# Patient Record
Sex: MALE | Race: WHITE | Employment: STUDENT | URBAN - NONMETROPOLITAN AREA
[De-identification: names, ages, dates, MRNs, and addresses within clinical notes are randomized per-mention and may not be internally consistent; named-entity substitution may affect disease eponyms.]

---

## 2023-10-05 ENCOUNTER — NURSE ONLY (OUTPATIENT)
Age: 23
End: 2023-10-05

## 2023-10-05 DIAGNOSIS — Z51.6 ALLERGY DESENSITIZATION THERAPY: Primary | ICD-10-CM

## 2023-10-05 PROCEDURE — 99999 PR OFFICE/OUTPT VISIT,PROCEDURE ONLY: CPT | Performed by: NURSE PRACTITIONER

## 2023-10-05 RX ORDER — BECLOMETHASONE DIPROPIONATE 80 UG/1
AEROSOL, METERED NASAL
COMMUNITY
Start: 2023-09-29

## 2023-10-05 RX ORDER — LISDEXAMFETAMINE DIMESYLATE CAPSULES 60 MG/1
CAPSULE ORAL
COMMUNITY
Start: 2023-09-01

## 2023-10-05 RX ORDER — ROSUVASTATIN CALCIUM 20 MG/1
20 TABLET, COATED ORAL DAILY
COMMUNITY
Start: 2023-09-28

## 2023-10-05 RX ORDER — DULOXETIN HYDROCHLORIDE 60 MG/1
CAPSULE, DELAYED RELEASE ORAL DAILY
COMMUNITY
Start: 2023-09-28

## 2023-10-05 SDOH — ECONOMIC STABILITY: HOUSING INSECURITY
IN THE LAST 12 MONTHS, WAS THERE A TIME WHEN YOU DID NOT HAVE A STEADY PLACE TO SLEEP OR SLEPT IN A SHELTER (INCLUDING NOW)?: NO

## 2023-10-05 SDOH — ECONOMIC STABILITY: FOOD INSECURITY: WITHIN THE PAST 12 MONTHS, YOU WORRIED THAT YOUR FOOD WOULD RUN OUT BEFORE YOU GOT MONEY TO BUY MORE.: NEVER TRUE

## 2023-10-05 SDOH — ECONOMIC STABILITY: FOOD INSECURITY: WITHIN THE PAST 12 MONTHS, THE FOOD YOU BOUGHT JUST DIDN'T LAST AND YOU DIDN'T HAVE MONEY TO GET MORE.: NEVER TRUE

## 2023-10-05 SDOH — ECONOMIC STABILITY: INCOME INSECURITY: HOW HARD IS IT FOR YOU TO PAY FOR THE VERY BASICS LIKE FOOD, HOUSING, MEDICAL CARE, AND HEATING?: NOT HARD AT ALL

## 2023-10-05 ASSESSMENT — PATIENT HEALTH QUESTIONNAIRE - PHQ9
1. LITTLE INTEREST OR PLEASURE IN DOING THINGS: 0
SUM OF ALL RESPONSES TO PHQ9 QUESTIONS 1 & 2: 0
SUM OF ALL RESPONSES TO PHQ QUESTIONS 1-9: 0
2. FEELING DOWN, DEPRESSED OR HOPELESS: 0
SUM OF ALL RESPONSES TO PHQ QUESTIONS 1-9: 0

## 2023-10-05 NOTE — PROGRESS NOTES
After consent obtained/verified, allergy injection given in back of R/L arm(s). Documentation of vial injection specific to arm(s) noted on Allergy Immunotherapy Administration Form. Patient waited 30 minutes for observation. Yes      Patient tolerated well without adverse reaction WHILE IN OFFICE    SHOT REACTION TREATMENT INSTRUCTIONS    During the 30 minute wait after an allergy injection the following symptoms should be reported:    Itching other than at the injection site  Hives or swelling other than at the injection site  Redness other than at the injection site  Difficulty breathing  Chest tightness  Difficulty swallowing  Throat tightness    If these symptoms occur, NOTIFY PROVIDER and the following treatment should be administered:    1. Epinephrine/Auvi Q 1:1000 IM - 0.3 ml if > 66 lbs or more, 0.15 ml if 33 - 63 lbs, or 0.1 ml if <33 lbs     2. Diphenhydramine - give all intramuscular:     2 to <6 years (off-label use): 6.25 mg,    6 to <12 years: 12.5 to 25 mg;    ?12 years: 25-50 mg.    3.  Famotidine:  Adults 40 mg oral    Adolescents age 12 years and >88 lbs: 40 mg    Children and Adolescents ? 12years of age: Initial: 0.25 mg/kg/dose  every 12 hours (maximum daily dose: 40 mg/day)    Epi/Auvi Q dose may me repeated in 5-15 minutes if adequate resolution of symptoms does not occur    Patient should be observed for at least one hour after final Epi/Auvi Q dose and must be seen by provider. Patients cannot drive themselves if they have received diphenhydramine.

## 2023-10-20 ENCOUNTER — OFFICE VISIT (OUTPATIENT)
Age: 23
End: 2023-10-20

## 2023-10-20 VITALS
TEMPERATURE: 97.5 F | HEART RATE: 98 BPM | HEIGHT: 70 IN | SYSTOLIC BLOOD PRESSURE: 120 MMHG | WEIGHT: 194 LBS | DIASTOLIC BLOOD PRESSURE: 74 MMHG | BODY MASS INDEX: 27.77 KG/M2

## 2023-10-20 DIAGNOSIS — F90.0 ATTENTION DEFICIT HYPERACTIVITY DISORDER, INATTENTIVE TYPE: Primary | ICD-10-CM

## 2023-10-20 PROBLEM — F90.9 ADHD (ATTENTION DEFICIT HYPERACTIVITY DISORDER): Status: ACTIVE | Noted: 2023-10-20

## 2023-10-20 PROBLEM — F41.9 ANXIETY: Status: ACTIVE | Noted: 2023-10-20

## 2023-10-20 PROCEDURE — 99999 PR OFFICE/OUTPT VISIT,PROCEDURE ONLY: CPT | Performed by: NURSE PRACTITIONER

## 2023-10-20 RX ORDER — TRIAMCINOLONE ACETONIDE 55 UG/1
SPRAY, METERED NASAL
COMMUNITY
End: 2023-10-20

## 2023-10-20 RX ORDER — LANOLIN ALCOHOL/MO/W.PET/CERES
3 CREAM (GRAM) TOPICAL DAILY
COMMUNITY

## 2023-10-20 RX ORDER — LISDEXAMFETAMINE DIMESYLATE 30 MG/1
CAPSULE ORAL
COMMUNITY
Start: 2023-09-23 | End: 2023-10-20 | Stop reason: ALTCHOICE

## 2023-10-20 RX ORDER — LISDEXAMFETAMINE DIMESYLATE CAPSULES 60 MG/1
60 CAPSULE ORAL DAILY
Qty: 30 CAPSULE | Refills: 0 | Status: SHIPPED | OUTPATIENT
Start: 2023-10-30 | End: 2023-11-29

## 2023-10-20 NOTE — PROGRESS NOTES
plan.Follow up as directed.      Electronically signed by SARANYA Bucio CNP on 10/20/2023 at 11:08 AM

## 2023-11-28 ENCOUNTER — TELEPHONE (OUTPATIENT)
Age: 23
End: 2023-11-28

## 2023-11-28 DIAGNOSIS — F90.0 ATTENTION DEFICIT HYPERACTIVITY DISORDER, INATTENTIVE TYPE: ICD-10-CM

## 2023-11-28 RX ORDER — LISDEXAMFETAMINE DIMESYLATE CAPSULES 60 MG/1
60 CAPSULE ORAL DAILY
Qty: 30 CAPSULE | Refills: 0 | Status: SHIPPED | OUTPATIENT
Start: 2023-12-06 | End: 2024-01-05

## 2023-11-28 RX ORDER — LISDEXAMFETAMINE DIMESYLATE CAPSULES 60 MG/1
60 CAPSULE ORAL DAILY
Qty: 30 CAPSULE | Refills: 0 | Status: SHIPPED | OUTPATIENT
Start: 2024-01-03 | End: 2024-02-02

## 2023-11-28 NOTE — TELEPHONE ENCOUNTER
Pt contacted [facility] stating he is out of Vyvanse, requesting refill. Last seen 10/20/2023 and given 1 month supply. Currently has 8 doses left. Tolerating well with no side effects. Denies chest pain. Will give 2 rx's today- pt to f/u with PCP when he runs out.   Case discussed with Cristina Guzman MD.

## 2024-01-08 ENCOUNTER — TELEPHONE (OUTPATIENT)
Age: 24
End: 2024-01-08

## 2024-01-08 NOTE — TELEPHONE ENCOUNTER
Provider had called pharmacy asking if a new RX was needed they stated they were able to just switch it on their end but an approval was still needed on the Vyvanse. I called the insurance back and told them what the pharmacy has said, nakia was able to give me a case number and told me to ask them to run the insurance again when I called. Call then to pharmacy with case # and asked them to run it again and it was able to go through, they will fill the RX today. Case # [id number]

## 2024-01-08 NOTE — TELEPHONE ENCOUNTER
Received a voicemail from patient's insurance stating they needed a prior authorization started for patient's medication, Lisdexamfetamine Dimesylate 60MG. After [facility] with [facility] [employer] [facility] [employer] [facility] she stated that patient has been on Vyvanse and not the Lisdexamfetamine Dimesylate and the Vyvanse does not need a prior authorization, are we able to switch the medication to the Vyvanse?